# Patient Record
Sex: FEMALE | Race: WHITE | NOT HISPANIC OR LATINO | Employment: FULL TIME | URBAN - METROPOLITAN AREA
[De-identification: names, ages, dates, MRNs, and addresses within clinical notes are randomized per-mention and may not be internally consistent; named-entity substitution may affect disease eponyms.]

---

## 2024-01-04 ENCOUNTER — TELEMEDICINE (OUTPATIENT)
Dept: PRIMARY CARE | Facility: CLINIC | Age: 34
End: 2024-01-04
Payer: COMMERCIAL

## 2024-01-04 VITALS
DIASTOLIC BLOOD PRESSURE: 70 MMHG | WEIGHT: 129 LBS | TEMPERATURE: 97.2 F | HEART RATE: 88 BPM | BODY MASS INDEX: 22.86 KG/M2 | SYSTOLIC BLOOD PRESSURE: 120 MMHG | HEIGHT: 63 IN | OXYGEN SATURATION: 99 %

## 2024-01-04 DIAGNOSIS — J02.9 PHARYNGITIS, UNSPECIFIED ETIOLOGY: Primary | ICD-10-CM

## 2024-01-04 DIAGNOSIS — R51.9 NONINTRACTABLE HEADACHE, UNSPECIFIED CHRONICITY PATTERN, UNSPECIFIED HEADACHE TYPE: ICD-10-CM

## 2024-01-04 DIAGNOSIS — J02.0 STREP PHARYNGITIS: ICD-10-CM

## 2024-01-04 PROBLEM — Q51.22 SUBSEPTATE UTERUS: Status: ACTIVE | Noted: 2017-04-24

## 2024-01-04 LAB
POC RAPID STREP: POSITIVE
POC SARS-COV-2 AG BINAX: ABNORMAL

## 2024-01-04 PROCEDURE — 99213 OFFICE O/P EST LOW 20 MIN: CPT | Performed by: FAMILY MEDICINE

## 2024-01-04 PROCEDURE — 87811 SARS-COV-2 COVID19 W/OPTIC: CPT | Performed by: FAMILY MEDICINE

## 2024-01-04 PROCEDURE — 87880 STREP A ASSAY W/OPTIC: CPT | Performed by: FAMILY MEDICINE

## 2024-01-04 RX ORDER — AZITHROMYCIN 250 MG/1
TABLET, FILM COATED ORAL
Qty: 6 TABLET | Refills: 0 | Status: SHIPPED | OUTPATIENT
Start: 2024-01-04 | End: 2024-01-09

## 2024-01-04 ASSESSMENT — ENCOUNTER SYMPTOMS
FEVER: 1
SORE THROAT: 1
COUGH: 1

## 2024-01-04 NOTE — PROGRESS NOTES
"Subjective   Patient ID: Urbano Walker is a 33 y.o. female who presents for Sore Throat, Fever, Chills, and Cough.    Sore Throat   Associated symptoms include coughing.   Fever   Associated symptoms include coughing and a sore throat.   Cough  Associated symptoms include a fever and a sore throat.      Had on miscarrige and she thinks she is going through it again now and will see ob gyn today about this. Has bad s/t and neck hurts . F/c/s. Ear pain. Started Monday. No covid test taken. No muscle.   Review of Systems   Constitutional:  Positive for fever.   HENT:  Positive for sore throat.    Respiratory:  Positive for cough.        Objective   /70 (BP Location: Left arm, Patient Position: Sitting, BP Cuff Size: Adult)   Pulse 88   Temp 36.2 °C (97.2 °F)   Ht 1.6 m (5' 3\")   Wt 58.5 kg (129 lb)   SpO2 99%   BMI 22.85 kg/m²     Physical Exam  Constitutional:       Appearance: Normal appearance.   HENT:      Head: Normocephalic.      Right Ear: Tympanic membrane normal.      Nose: Nose normal.      Mouth/Throat:      Mouth: Mucous membranes are moist.      Pharynx: Posterior oropharyngeal erythema present.   Eyes:      Pupils: Pupils are equal, round, and reactive to light.   Cardiovascular:      Rate and Rhythm: Normal rate and regular rhythm.      Pulses: Normal pulses.   Pulmonary:      Effort: Pulmonary effort is normal.   Abdominal:      General: Abdomen is flat.   Musculoskeletal:         General: Normal range of motion.      Cervical back: Normal range of motion. Tenderness present.   Lymphadenopathy:      Cervical: Cervical adenopathy present.   Skin:     General: Skin is warm.   Neurological:      Mental Status: She is alert.   Psychiatric:         Mood and Affect: Mood normal.         Assessment/Plan   Diagnoses and all orders for this visit:  Pharyngitis, unspecified etiology  -     POCT BinaxNOW Covid-19 Ag Card manually resulted  -     POCT Rapid Strep A manually resulted  Nonintractable " headache, unspecified chronicity pattern, unspecified headache type     Pt covid positive and strep positive. She is preg. Since unsure of paxlovid for pregnancy we will not send this in. She will need to contact her ob about this but azithromycin being sent in for strep. Pt informed she needs to be in isolation for at least 5 days. Can come out of isolation after 5 days with a mask as long as no fever. She expressed understanding